# Patient Record
Sex: MALE | Race: WHITE | NOT HISPANIC OR LATINO | Employment: UNEMPLOYED | URBAN - METROPOLITAN AREA
[De-identification: names, ages, dates, MRNs, and addresses within clinical notes are randomized per-mention and may not be internally consistent; named-entity substitution may affect disease eponyms.]

---

## 2023-02-23 ENCOUNTER — HOSPITAL ENCOUNTER (EMERGENCY)
Facility: HOSPITAL | Age: 48
Discharge: HOME | End: 2023-02-23
Attending: FAMILY MEDICINE
Payer: MEDICAID

## 2023-02-23 VITALS
HEART RATE: 99 BPM | WEIGHT: 155.44 LBS | RESPIRATION RATE: 16 BRPM | BODY MASS INDEX: 22.25 KG/M2 | OXYGEN SATURATION: 95 % | DIASTOLIC BLOOD PRESSURE: 77 MMHG | SYSTOLIC BLOOD PRESSURE: 140 MMHG | TEMPERATURE: 98 F | HEIGHT: 70 IN

## 2023-02-23 DIAGNOSIS — H66.92 ACUTE LEFT OTITIS MEDIA: Primary | ICD-10-CM

## 2023-02-23 DIAGNOSIS — H60.312 ACUTE DIFFUSE OTITIS EXTERNA OF LEFT EAR: ICD-10-CM

## 2023-02-23 PROCEDURE — 99284 EMERGENCY DEPT VISIT MOD MDM: CPT

## 2023-02-23 RX ORDER — IBUPROFEN 600 MG/1
600 TABLET ORAL 3 TIMES DAILY PRN
Qty: 16 TABLET | Refills: 0 | Status: SHIPPED | OUTPATIENT
Start: 2023-02-23

## 2023-02-23 RX ORDER — AMOXICILLIN 875 MG/1
875 TABLET, FILM COATED ORAL 2 TIMES DAILY
Qty: 14 TABLET | Refills: 0 | Status: SHIPPED | OUTPATIENT
Start: 2023-02-23 | End: 2023-03-02

## 2023-02-23 RX ORDER — OFLOXACIN 3 MG/ML
10 SOLUTION AURICULAR (OTIC) DAILY
Qty: 4.67 ML | Refills: 0 | Status: SHIPPED | OUTPATIENT
Start: 2023-02-23 | End: 2023-03-02

## 2023-02-23 RX ORDER — MECLIZINE HYDROCHLORIDE 25 MG/1
25 TABLET ORAL 3 TIMES DAILY PRN
Qty: 16 TABLET | Refills: 0 | Status: SHIPPED | OUTPATIENT
Start: 2023-02-23

## 2023-02-23 NOTE — ED PROVIDER NOTES
"Encounter Date: 2/23/2023       History     Chief Complaint   Patient presents with    Otalgia     Pt in /[facility] w co lt ear ache w decrease in hearing x 3 days.  Pt states it is making him fell "dizzy"  VSS.      Patient is a 47 year old male with complaints of left ear pain with decreased hearing x 3 days.   He states this has been causing intermittent dizziness since ear pain began ( no current dizziness).   Patient rates his pain 7/10.  He denies vision changes, fever, chills, nausea, vomiting, chest pain, SOB, headache.      The history is provided by the patient. No  was used.   Otalgia  This is a new problem. Illness onset: x 3 days ago. There is pain in the left ear. The problem occurs throughout the day. The problem has been unchanged. The pain is at a severity of 7/10. Associated symptoms include hearing loss. Pertinent negatives include no ear discharge, no headaches, no rhinorrhea, no sore throat, no abdominal pain, no diarrhea, no vomiting, no neck pain and no cough.   Review of patient's allergies indicates:  No Known Allergies  History reviewed. No pertinent past medical history.  History reviewed. No pertinent surgical history.  History reviewed. No pertinent family history.  Social History     Tobacco Use    Smoking status: Former     Types: Cigarettes    Smokeless tobacco: Never     Review of Systems   Constitutional:  Negative for appetite change, chills and fever.   HENT:  Positive for ear pain and hearing loss. Negative for congestion, ear discharge, rhinorrhea, sinus pressure, sinus pain and sore throat.    Eyes:  Negative for photophobia, pain, discharge, redness, itching and visual disturbance.   Respiratory:  Negative for cough and shortness of breath.    Cardiovascular:  Negative for chest pain and palpitations.   Gastrointestinal:  Negative for abdominal pain, diarrhea, nausea and vomiting.   Genitourinary:  Negative for dysuria and flank pain.   Musculoskeletal:  " Negative for back pain and neck pain.   Neurological:  Positive for dizziness. Negative for syncope, weakness, light-headedness and headaches.   Hematological:  Negative for adenopathy. Does not bruise/bleed easily.     Physical Exam     Initial Vitals [02/23/23 0742]   BP Pulse Resp Temp SpO2   (!) 140/77 99 16 98.2 °F (36.8 °C) 95 %      MAP       --         Physical Exam    Nursing note and vitals reviewed.  Constitutional: He appears well-developed and well-nourished.   HENT:   Right Ear: Tympanic membrane, external ear and ear canal normal.   Left Ear: There is swelling and tenderness. Tympanic membrane is erythematous.   Nose: Nose normal.   Mouth/Throat: Oropharynx is clear and moist.   Eyes: Conjunctivae and EOM are normal. Pupils are equal, round, and reactive to light.   Neck: Neck supple.   Normal range of motion.  Cardiovascular:  Normal rate, normal heart sounds and intact distal pulses.           Pulmonary/Chest: Breath sounds normal.   Abdominal: Abdomen is soft. Bowel sounds are normal. There is no abdominal tenderness.   Musculoskeletal:         General: Normal range of motion.      Cervical back: Normal range of motion and neck supple.     Lymphadenopathy:     He has no cervical adenopathy.   Neurological: He is alert and oriented to person, place, and time. He has normal strength. GCS score is 15. GCS eye subscore is 4. GCS verbal subscore is 5. GCS motor subscore is 6.   Skin: Skin is warm. Capillary refill takes less than 2 seconds.       ED Course   Procedures  Labs Reviewed - No data to display       Imaging Results    None          Medications - No data to display  Medical Decision Making:   Initial Assessment:   Patient is a 47 year old male with complaints of left ear pain with decreased hearing x 3 days.    Differential Diagnosis:   Otitis media, Otitis externa, cerumen impaction, ruptured tympanic membrane   ED Management:  Prescribed meds: Amoxicillin, Ofloxacin, Ibuprofen and Meclizine    The patient is resting comfortably and in no acute distress.  I personally discussed his treatment plan.  Gave strict ED precautions and specific conditions for return to the emergency department and importance of follow up with pcp.  Patient voices understanding and agrees to the plan discussed. All patients' questions have been answered at this time. He has remained hemodynamically stable throughout entire stay in ED and is stable for discharge home.                         Clinical Impression:   Final diagnoses:  [H66.92] Acute left otitis media (Primary)  [H60.312] Acute diffuse otitis externa of left ear        ED Disposition Condition    Discharge Stable          ED Prescriptions       Medication Sig Dispense Start Date End Date Auth. Provider    ofloxacin (FLOXIN) 0.3 % otic solution Place 10 drops into the left ear once daily. for 7 days 4.67 mL 2/23/2023 3/2/2023 AUSTIN Rodrigues    amoxicillin (AMOXIL) 875 MG tablet Take 1 tablet (875 mg total) by mouth 2 (two) times daily. for 7 days 14 tablet 2/23/2023 3/2/2023 AUSTIN Rodrigues    ibuprofen (ADVIL,MOTRIN) 600 MG tablet Take 1 tablet (600 mg total) by mouth 3 (three) times daily as needed for Pain. 16 tablet 2/23/2023 -- AUSTIN Rodrigues    meclizine (ANTIVERT) 25 mg tablet Take 1 tablet (25 mg total) by mouth 3 (three) times daily as needed for Dizziness. 16 tablet 2/23/2023 -- AUSTIN Rodrigues          Follow-up Information       Follow up With Specialties Details Why Contact Info    [facility] - Emergency Dept Emergency Medicine  As needed, If symptoms worsen [address] [facility] [address] [phone]             AUSTIN Rodrigues  02/23/23 1258

## 2023-03-27 DIAGNOSIS — H91.92 HEARING LOSS IN LEFT EAR: Primary | ICD-10-CM

## 2023-04-06 ENCOUNTER — OFFICE VISIT (OUTPATIENT)
Dept: OTOLARYNGOLOGY | Facility: CLINIC | Age: 48
End: 2023-04-06
Payer: MEDICAID

## 2023-04-06 VITALS
DIASTOLIC BLOOD PRESSURE: 79 MMHG | HEIGHT: 70 IN | WEIGHT: 166 LBS | TEMPERATURE: 98 F | BODY MASS INDEX: 23.77 KG/M2 | SYSTOLIC BLOOD PRESSURE: 119 MMHG | HEART RATE: 101 BPM

## 2023-04-06 DIAGNOSIS — J30.9 ALLERGIC RHINITIS, UNSPECIFIED SEASONALITY, UNSPECIFIED TRIGGER: ICD-10-CM

## 2023-04-06 DIAGNOSIS — H91.92 HEARING LOSS IN LEFT EAR: ICD-10-CM

## 2023-04-06 DIAGNOSIS — H91.92 HEARING LOSS OF LEFT EAR, UNSPECIFIED HEARING LOSS TYPE: Primary | ICD-10-CM

## 2023-04-06 DIAGNOSIS — H69.92 DYSFUNCTION OF LEFT EUSTACHIAN TUBE: ICD-10-CM

## 2023-04-06 PROCEDURE — 99203 PR OFFICE/OUTPT VISIT, NEW, LEVL III, 30-44 MIN: ICD-10-PCS | Mod: S$PBB,,, | Performed by: NURSE PRACTITIONER

## 2023-04-06 PROCEDURE — 3074F PR MOST RECENT SYSTOLIC BLOOD PRESSURE < 130 MM HG: ICD-10-PCS | Mod: CPTII,,, | Performed by: NURSE PRACTITIONER

## 2023-04-06 PROCEDURE — 99203 OFFICE O/P NEW LOW 30 MIN: CPT | Mod: S$PBB,,, | Performed by: NURSE PRACTITIONER

## 2023-04-06 PROCEDURE — 3078F PR MOST RECENT DIASTOLIC BLOOD PRESSURE < 80 MM HG: ICD-10-PCS | Mod: CPTII,,, | Performed by: NURSE PRACTITIONER

## 2023-04-06 PROCEDURE — 3008F BODY MASS INDEX DOCD: CPT | Mod: CPTII,,, | Performed by: NURSE PRACTITIONER

## 2023-04-06 PROCEDURE — 1159F MED LIST DOCD IN RCRD: CPT | Mod: CPTII,,, | Performed by: NURSE PRACTITIONER

## 2023-04-06 PROCEDURE — 3008F PR BODY MASS INDEX (BMI) DOCUMENTED: ICD-10-PCS | Mod: CPTII,,, | Performed by: NURSE PRACTITIONER

## 2023-04-06 PROCEDURE — 3078F DIAST BP <80 MM HG: CPT | Mod: CPTII,,, | Performed by: NURSE PRACTITIONER

## 2023-04-06 PROCEDURE — 1159F PR MEDICATION LIST DOCUMENTED IN MEDICAL RECORD: ICD-10-PCS | Mod: CPTII,,, | Performed by: NURSE PRACTITIONER

## 2023-04-06 PROCEDURE — 3074F SYST BP LT 130 MM HG: CPT | Mod: CPTII,,, | Performed by: NURSE PRACTITIONER

## 2023-04-06 PROCEDURE — 99213 OFFICE O/P EST LOW 20 MIN: CPT | Mod: PBBFAC | Performed by: NURSE PRACTITIONER

## 2023-04-06 RX ORDER — FLUTICASONE PROPIONATE 50 MCG
2 SPRAY, SUSPENSION (ML) NASAL 2 TIMES DAILY
Qty: 16 G | Refills: 11 | Status: SHIPPED | OUTPATIENT
Start: 2023-04-06 | End: 2023-05-06

## 2023-04-06 NOTE — PROGRESS NOTES
"[facility]  Otolaryngology Clinic Note    Shivam Salinas  YOB: 1975    Chief Complaint: HL    HPI: 2023: 47yoM presents with HL. He states in February he had a bad left ear infection with associated otalgia, otorrhea, dizziness, and HL. The drainge, pain, and dizziness stopped following abx treatment, but his hearing has not improved. He endorses hx of chronic ear infections in childhood but denies ever having PE tubes or otologic surgery. Denies tinnitus. Endorses some improvement with attempted autoinsufflation. States the pollen has been bothering his allergies at times.     ROS:   10-point review of systems negative except per HPI      Review of patient's allergies indicates:  No Known Allergies    History reviewed. No pertinent past medical history.    History reviewed. No pertinent surgical history.    Social History     Socioeconomic History    Marital status: Single   Tobacco Use    Smoking status: Former     Types: Cigarettes     Quit date:      Years since quittin.2    Smokeless tobacco: Never       History reviewed. No pertinent family history.    Outpatient Encounter Medications as of 2023   Medication Sig Dispense Refill    ibuprofen (ADVIL,MOTRIN) 600 MG tablet Take 1 tablet (600 mg total) by mouth 3 (three) times daily as needed for Pain. 16 tablet 0    meclizine (ANTIVERT) 25 mg tablet Take 1 tablet (25 mg total) by mouth 3 (three) times daily as needed for Dizziness. 16 tablet 0     No facility-administered encounter medications on file as of 2023.       Physical Exam:  Vitals:    23 0848   BP: 119/79   BP Location: Right arm   Patient Position: Sitting   BP Method: Large (Automatic)   Pulse: 101   Temp: 98 °F (36.7 °C)   TempSrc: Oral   Weight: 75.3 kg (166 lb)   Height: 5' 10" (1.778 m)       General: NAD, voice normal  Neuro: AAO, CN II - XII grossly intact  Head/ Face: NCAT, symmetric, sensations intact bilaterally  Eyes: EOMI, " PERRL  Ears: externally normal with grossly normal hearing. Able to hear finger rub on right but not left  AD: EAC patent, TM intact, no middle ear effusion, no retractions. Able to autoinsufflate  AS: EAC patent, TM intact, no middle ear effusion, no retractions. Unable to autoinsufflate   Nose: bilateral nares patent, midline septum, no rhinorrhea, no external deformity, no turbinate hypertrophy  OC/OP: MMM, no intraoral lesions, FOM/BOT soft, no trismus, dentition is moderate, no uvular deviation, bilaterally symmetric soft palate elevation, palatoglossus and palatopharyngeal fold wnl; tonsils are symmetric and 1+  Indirect laryngoscopy: deferred due to patient intolerance  Neck: soft, supple, no LAD, normal ROM, no thyromegaly  Respiratory: nonlabored, no wheezing, bilateral chest rise  Cardiovascular: RRR  Gastrointestinal: S NT ND  Skin: warm, no lesions  Musculoskeletal: 5/5 strength  Psych: Appropriate affect/mood     Pertinent Data:  ? LABS:  ? AUDIO:           ? PATH:      Imaging:   I personally reviewed the following images:        Assessment/Plan:  47 y.o. male with left HL, likely 2/2 ETD.   - NSI BID  - Follow with flonase BID  - Autoinsufflate several times daily  - Audio  - RTC 4-6 weeks     Zully Leyva NP

## 2023-05-03 ENCOUNTER — CLINICAL SUPPORT (OUTPATIENT)
Dept: AUDIOLOGY | Facility: HOSPITAL | Age: 48
End: 2023-05-03
Payer: MEDICAID

## 2023-05-03 DIAGNOSIS — H91.92 HEARING LOSS OF LEFT EAR, UNSPECIFIED HEARING LOSS TYPE: ICD-10-CM

## 2023-05-03 DIAGNOSIS — H90.A32 MIXED CONDUCTIVE AND SENSORINEURAL HEARING LOSS OF LEFT EAR WITH RESTRICTED HEARING OF RIGHT EAR: Primary | ICD-10-CM

## 2023-05-03 PROCEDURE — 92567 TYMPANOMETRY: CPT | Performed by: AUDIOLOGIST-HEARING AID FITTER

## 2023-05-03 PROCEDURE — 92557 COMPREHENSIVE HEARING TEST: CPT | Performed by: AUDIOLOGIST-HEARING AID FITTER

## 2023-05-03 NOTE — PROGRESS NOTES
Audiological Evaluation    Patient History:    Patient evaluated today to assess hearing.  He reportedly perceives decreased hearing of the left ear only following recent middle ear involvement.  Mr. Rabago was reportedly treated with otologic drops and oral antibiotics in February of 2023, however continues to perceive aural fullness and decreased hearing AS.   Tinnitus, otalgia, otorrhea and a history of otologic procedures have been denied at this time.  Patient's medical history is reportedly unchanged since most recent medical evaluation.       Pure Tone Testing:    Right ear:        Normal to moderate, SNHL    Left ear:          Slight to moderate, mixed HL         Tympanometry:      Right ear:   Type 'A' tympanogram    Left ear: Type 'As' tympanogram (0.10 mL)          Acoustic Reflex Testing    Right ear:   Did not test     Left ear: Did not test        Interpretations:    Pure tone testing revealed normal to moderate, sensorineural hearing loss for the right ear.  Testing for the left ear revealed a slight to moderate, mixed hearing loss. Speech reception thresholds were obtained at 20 dB HL (right ear) and 30 dB HL (left ear) consistent with pure tone results, bilaterally.  Word recognition scores were excellent, bilaterally.  Immittance testing revealed a Type A tympanogram for the right ear indicative of normal middle ear function; a Type As tympanogram was noted for the left ear indicative of reduced TM mobility. Otoscopy revealed clear EACs, bilaterally.      Recommendations:     Audiological testing per ENT  ENT evaluation (5/18/2023)  Hearing protection when exposed to hazardous noise    Joyce Mathews.  Clinical Audiologist

## 2023-05-18 ENCOUNTER — OFFICE VISIT (OUTPATIENT)
Dept: OTOLARYNGOLOGY | Facility: CLINIC | Age: 48
End: 2023-05-18
Payer: MEDICAID

## 2023-05-18 VITALS — SYSTOLIC BLOOD PRESSURE: 132 MMHG | TEMPERATURE: 98 F | HEART RATE: 85 BPM | DIASTOLIC BLOOD PRESSURE: 79 MMHG

## 2023-05-18 DIAGNOSIS — H90.A32 MIXED CONDUCTIVE AND SENSORINEURAL HEARING LOSS OF LEFT EAR WITH RESTRICTED HEARING OF RIGHT EAR: ICD-10-CM

## 2023-05-18 DIAGNOSIS — H69.92 DYSFUNCTION OF LEFT EUSTACHIAN TUBE: Primary | ICD-10-CM

## 2023-05-18 PROCEDURE — 1159F PR MEDICATION LIST DOCUMENTED IN MEDICAL RECORD: ICD-10-PCS | Mod: CPTII,,, | Performed by: NURSE PRACTITIONER

## 2023-05-18 PROCEDURE — 1159F MED LIST DOCD IN RCRD: CPT | Mod: CPTII,,, | Performed by: NURSE PRACTITIONER

## 2023-05-18 PROCEDURE — 99214 OFFICE O/P EST MOD 30 MIN: CPT | Mod: S$PBB,,, | Performed by: NURSE PRACTITIONER

## 2023-05-18 PROCEDURE — 99213 OFFICE O/P EST LOW 20 MIN: CPT | Mod: PBBFAC | Performed by: NURSE PRACTITIONER

## 2023-05-18 PROCEDURE — 3078F PR MOST RECENT DIASTOLIC BLOOD PRESSURE < 80 MM HG: ICD-10-PCS | Mod: CPTII,,, | Performed by: NURSE PRACTITIONER

## 2023-05-18 PROCEDURE — 3075F SYST BP GE 130 - 139MM HG: CPT | Mod: CPTII,,, | Performed by: NURSE PRACTITIONER

## 2023-05-18 PROCEDURE — 92504 EAR MICROSCOPY EXAMINATION: CPT | Mod: PBBFAC,LT | Performed by: NURSE PRACTITIONER

## 2023-05-18 PROCEDURE — 92504 PR EAR MICROSCOPY EXAMINATION: ICD-10-PCS | Mod: S$PBB,LT,, | Performed by: NURSE PRACTITIONER

## 2023-05-18 PROCEDURE — 3075F PR MOST RECENT SYSTOLIC BLOOD PRESS GE 130-139MM HG: ICD-10-PCS | Mod: CPTII,,, | Performed by: NURSE PRACTITIONER

## 2023-05-18 PROCEDURE — 99214 PR OFFICE/OUTPT VISIT, EST, LEVL IV, 30-39 MIN: ICD-10-PCS | Mod: S$PBB,,, | Performed by: NURSE PRACTITIONER

## 2023-05-18 PROCEDURE — 92504 EAR MICROSCOPY EXAMINATION: CPT | Mod: S$PBB,LT,, | Performed by: NURSE PRACTITIONER

## 2023-05-18 PROCEDURE — 3078F DIAST BP <80 MM HG: CPT | Mod: CPTII,,, | Performed by: NURSE PRACTITIONER
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
## 2023-06-22 ENCOUNTER — PROCEDURE VISIT (OUTPATIENT)
Dept: OTOLARYNGOLOGY | Facility: CLINIC | Age: 48
End: 2023-06-22
Payer: MEDICAID

## 2023-06-22 VITALS
SYSTOLIC BLOOD PRESSURE: 135 MMHG | HEIGHT: 70 IN | DIASTOLIC BLOOD PRESSURE: 73 MMHG | TEMPERATURE: 98 F | HEART RATE: 95 BPM | BODY MASS INDEX: 23.96 KG/M2 | OXYGEN SATURATION: 96 % | WEIGHT: 167.38 LBS

## 2023-06-22 DIAGNOSIS — H69.92 DYSFUNCTION OF LEFT EUSTACHIAN TUBE: ICD-10-CM

## 2023-06-22 DIAGNOSIS — H90.A32 MIXED CONDUCTIVE AND SENSORINEURAL HEARING LOSS OF LEFT EAR WITH RESTRICTED HEARING OF RIGHT EAR: Primary | ICD-10-CM

## 2023-06-22 PROCEDURE — 69433 CREATE EARDRUM OPENING: CPT | Mod: PBBFAC | Performed by: STUDENT IN AN ORGANIZED HEALTH CARE EDUCATION/TRAINING PROGRAM

## 2023-06-22 RX ORDER — OFLOXACIN 3 MG/ML
5 SOLUTION AURICULAR (OTIC)
Status: DISCONTINUED | OUTPATIENT
Start: 2023-06-22 | End: 2023-06-22

## 2023-06-22 RX ORDER — OFLOXACIN 3 MG/ML
1 SOLUTION/ DROPS OPHTHALMIC
Status: DISPENSED | OUTPATIENT
Start: 2023-06-22

## 2023-06-22 NOTE — PROCEDURES
[facility] OTOLARYNGOLOGY PROCEDURE REPORT    Patient Name: Shivam Salinas  YOB: 1975  Date of procedure: 06/22/2023    Attending Surgeon: Scotty Ware    Resident Surgeon(s):   Kvng Hoffman MD, [facility]    Pre-operative diagnosis:  1. Left mixed hearing loss  2. Eustachian tube dysfunction, left  3. Aural fullness, left     Post-operative diagnosis:   Same as above    Procedure:  1. Myringotomy with tympanostomy tube placement, left    Anesthesia: Topical phenol    Blood Loss: None    Implants: Medtronic Negron Modified Beveled Grommet Ventilation tube, Fluoroplastic, White, 1.14 mm I.D.    Drains: None    Specimens: None    Complications: None    Findings: No middle ear effusion. Tube placed successfully.     Indication:  Shivam Salinas is a 48 y.o. male with left mixed hearing loss and Eustachian tube dysfunction. All treatment options were discussed, including observation, medical management, and surgical intervention. Ultimately, the joint decision was made to proceed with left myringotomy and placement of tympanostomy tube. Informed consent was obtained from patient. All risks, benefits, and alternatives were reviewed, and all questions were answered.     Procedure in detail:    The patient was laid supine in the procedure chair. Attention was turned to the left ear. An ear speculum was inserted into the external auditory canal and the microscope was used to visualize the external auditory canal. Obstructive cerumen was removed using a curette and Cadena suction. Topical phenol was applied to the anterior inferior portion of the tympanic membrane. Once the tympanic membrane was visualized in its entirety, a myringotomy knife was used to make a radial incision in the anterior inferior aspect of the tympanic membrane. 3Fr Cadena tip suction was used to attempt to evacuate any fluid from the middle ear space but there was none. A white negron grommet tympanostomy tube was inserted into the  myringotomy using alligator forceps and adjusted into position using a pick. This was confirmed to be in good position and patent. Ofloxacin drops were applied and a cotton ball was placed.     The patient tolerated the procedure well without complications.     Post-Procedure Instructions  - Ofloxacin drops, 3 drops, TID, x 5 days  - RTC 4-6 weeks for tube check.     Kvng Hoffman MD  [facility]  6/22/2023 11:04 AM

## 2023-06-29 NOTE — PROGRESS NOTES
I reviewed the history and physical exam with the resident.   I agree with findings and plan.    Scotty Ware M.D.